# Patient Record
Sex: FEMALE | Race: WHITE
[De-identification: names, ages, dates, MRNs, and addresses within clinical notes are randomized per-mention and may not be internally consistent; named-entity substitution may affect disease eponyms.]

---

## 2020-03-30 ENCOUNTER — HOSPITAL ENCOUNTER (OUTPATIENT)
Dept: HOSPITAL 53 - M RAD | Age: 65
End: 2020-03-30
Payer: COMMERCIAL

## 2020-03-30 DIAGNOSIS — M54.16: Primary | ICD-10-CM

## 2020-03-30 PROCEDURE — 72158 MRI LUMBAR SPINE W/O & W/DYE: CPT

## 2020-03-30 NOTE — REP
MRI lumbar spine without and with IV gadolinium:

 

History:  Numbness in the legs.  Radiculopathy.  Left lower extremity

radiculopathy. No comparison lumbar imaging.

 

Technique:  Sagittal and axial T1 and T2-weighted scans are acquired in the usual

fashion with and without fat saturation.  Sequences include spin echo, turbo

spin-echo, and STIR imaging sequences. Gadolinium enhancement dose is 18 ml of

intravenous ProHance.

 

MRI findings: There is a large Schmorl's node and some concavity to the superior

endplate at the L1 vertebral body.  There are similar findings less pronounced at

L3.  Anterior vertebral body height is preserved at these levels.  There is no

evidence of acute depression fracture or collapse.  There are reactive marrow

changes associated with degenerative disc disease at several levels.  Cortical

and medullary bone signal intensity are otherwise normal.  The tip of the conus

medullaris is normal in position and appearance at T12-L1.  No extra vertebral

abnormality is observed. There is a mild levoconvex scoliotic curve.

 

There is diffuse bulging of the posterior disc margin at T12-L1.  This indents

the ventral margin of the thecal sac but there is no evidence of central canal

stenosis or compression of the conus.  No neural foraminal encroachment is

appreciated.

 

Axial and sagittal images taken at L1-L2 demonstrate mild diffuse disc bulging.

No other finding.

 

At L2-3, there is diffuse disc bulging.  There is mild central canal stenosis due

to disc bulging and developmentally short pedicles in combination with mild

ligamentum flavum and facet hypertrophy.  There is some dorsal epidural fat which

limits the diameter of the thecal sac.  The AP dimension of the thecal sac in the

midline at L2-L3 is 7.9 mm.  There is left-sided neural foraminal encroachment

from discogenic spurring, disc bulging, and facet hypertrophy.

 

At L3-L4, there is mild diffuse disc bulging indenting the ventral subarachnoid

space.  No foraminal narrowing is seen.  Canal size is borderline.  Midline AP

dimension of the thecal sac is 9.3 mm.  There is mild facet and ligamentum flavum

hypertrophy.

 

At L4-L5, there is mild central canal stenosis due to diffuse disc bulging,

ligamentum flavum and facet hypertrophy which is moderate, and developmentally

short pedicles.  No neural foraminal encroachment is appreciated.  Midline AP

dimension of the thecal sac at the L4-5 level is 8.5 mm.

 

At L5-S1, there is degenerative disc and osteoarthritic facet disease.  There is

a 3 mm grade 1 spondylolisthesis of L5 anterior with respect S1.  I suspect I

suspect bilateral pars defects as well and L5.  There is considerable

osteoarthritic facet hypertrophy and L5-S1 bilaterally.  No disc protrusion is

seen.  No significant neural foraminal encroachment is seen.

 

Impression:

 

Degenerative spondylosis changes with multilevel degenerative disc and

osteoarthritic facet disease.  Mild scoliosis.  There is mild central canal

stenosis at L2-3 and L4-5.

 

 

Electronically Signed by

Rafy Arguello MD 03/30/2020 07:38 P